# Patient Record
Sex: FEMALE | Race: WHITE
[De-identification: names, ages, dates, MRNs, and addresses within clinical notes are randomized per-mention and may not be internally consistent; named-entity substitution may affect disease eponyms.]

---

## 2017-02-20 ENCOUNTER — HOSPITAL (OUTPATIENT)
Dept: OTHER | Age: 37
End: 2017-02-20
Attending: FAMILY MEDICINE

## 2017-02-20 LAB
CONTROL LINE: PRESENT
INFLU A POC: NORMAL
INFLU B POC: NORMAL
Lab: CLEAR

## 2021-04-05 PROCEDURE — 88271 CYTOGENETICS DNA PROBE: CPT | Performed by: CLINICAL MEDICAL LABORATORY

## 2021-04-05 PROCEDURE — PSEU9972 CHROMOSOME ANALYSIS BONE MARROW: Performed by: CLINICAL MEDICAL LABORATORY

## 2021-04-05 PROCEDURE — 88237 TISSUE CULTURE BONE MARROW: CPT | Performed by: CLINICAL MEDICAL LABORATORY

## 2021-04-05 PROCEDURE — 88275 CYTOGENETICS 100-300: CPT | Performed by: CLINICAL MEDICAL LABORATORY

## 2021-04-05 PROCEDURE — 88264 CHROMOSOME ANALYSIS 20-25: CPT | Performed by: CLINICAL MEDICAL LABORATORY

## 2021-04-05 PROCEDURE — PSEU10080 FISH BCR/ABL: Performed by: CLINICAL MEDICAL LABORATORY

## 2021-04-06 ENCOUNTER — LAB REQUISITION (OUTPATIENT)
Dept: LAB | Age: 41
End: 2021-04-06

## 2021-04-06 DIAGNOSIS — D68.62 LUPUS ANTICOAGULANT SYNDROME (CMD): ICD-10-CM

## 2021-04-06 DIAGNOSIS — D72.829 ELEVATED WHITE BLOOD CELL COUNT, UNSPECIFIED: ICD-10-CM

## 2021-04-15 LAB
ADDITIONAL COMMENTS: NORMAL
CELL GROWTH FIB QL TISS CULTURE: NORMAL
KARYOTYP CVS: NORMAL
KARYOTYP CVS: NORMAL
Lab: NORMAL
Lab: NORMAL

## 2021-04-19 LAB
ADDITIONAL COMMENTS: NORMAL
BANDING TECHNIQUE: NORMAL
CELLS COUNTED: NORMAL
GENE ANALYSIS NARR RPT DOC: NORMAL
INTERPRETATION: NORMAL
KARYOTYP CVS: NORMAL
Lab: NORMAL
Lab: NORMAL

## 2023-01-14 ENCOUNTER — OFFICE VISIT (OUTPATIENT)
Dept: RHEUMATOLOGY | Facility: CLINIC | Age: 43
End: 2023-01-14

## 2023-01-14 VITALS
WEIGHT: 215 LBS | BODY MASS INDEX: 40.59 KG/M2 | SYSTOLIC BLOOD PRESSURE: 117 MMHG | HEIGHT: 61 IN | HEART RATE: 93 BPM | OXYGEN SATURATION: 92 % | DIASTOLIC BLOOD PRESSURE: 53 MMHG

## 2023-01-14 DIAGNOSIS — R53.83 FATIGUE, UNSPECIFIED TYPE: Primary | ICD-10-CM

## 2023-01-14 DIAGNOSIS — M79.18 DIFFUSE MYOFASCIAL PAIN SYNDROME: ICD-10-CM

## 2023-01-14 DIAGNOSIS — R21 RASH OF FACE: ICD-10-CM

## 2023-01-14 DIAGNOSIS — M25.50 POLYARTHRALGIA: ICD-10-CM

## 2023-01-14 PROCEDURE — 99204 OFFICE O/P NEW MOD 45 MIN: CPT | Performed by: INTERNAL MEDICINE

## 2023-01-14 PROCEDURE — 3078F DIAST BP <80 MM HG: CPT | Performed by: INTERNAL MEDICINE

## 2023-01-14 PROCEDURE — 3074F SYST BP LT 130 MM HG: CPT | Performed by: INTERNAL MEDICINE

## 2023-01-14 PROCEDURE — 3008F BODY MASS INDEX DOCD: CPT | Performed by: INTERNAL MEDICINE

## 2023-01-14 RX ORDER — ARIPIPRAZOLE 10 MG/1
TABLET ORAL
COMMUNITY
Start: 1900-01-01

## 2023-01-14 RX ORDER — ARIPIPRAZOLE 10 MG/1
TABLET ORAL
COMMUNITY
Start: 2023-01-02 | End: 2023-01-14

## 2023-01-14 RX ORDER — ARIPIPRAZOLE 20 MG/1
20 TABLET ORAL DAILY
COMMUNITY
Start: 2009-01-01

## 2023-01-14 RX ORDER — CEPHALEXIN 500 MG/1
CAPSULE ORAL
COMMUNITY
Start: 2023-01-10

## 2023-01-14 RX ORDER — HYDROCODONE BITARTRATE AND ACETAMINOPHEN 5; 325 MG/1; MG/1
TABLET ORAL
COMMUNITY
Start: 2022-12-15

## 2023-01-14 RX ORDER — CIPROFLOXACIN 500 MG/1
TABLET, FILM COATED ORAL EVERY 12 HOURS
COMMUNITY

## 2023-01-14 RX ORDER — OMEPRAZOLE 20 MG/1
20 TABLET, DELAYED RELEASE ORAL 2 TIMES DAILY
COMMUNITY
Start: 2022-12-19

## 2023-01-14 RX ORDER — DEXTROAMPHETAMINE SACCHARATE, AMPHETAMINE ASPARTATE, DEXTROAMPHETAMINE SULFATE AND AMPHETAMINE SULFATE 1.25; 1.25; 1.25; 1.25 MG/1; MG/1; MG/1; MG/1
TABLET ORAL
COMMUNITY
Start: 2022-10-13

## 2023-01-14 RX ORDER — MECLIZINE HYDROCHLORIDE 25 MG/1
TABLET ORAL
COMMUNITY
Start: 2022-07-01

## 2023-01-14 RX ORDER — CIPROFLOXACIN AND DEXAMETHASONE 3; 1 MG/ML; MG/ML
SUSPENSION/ DROPS AURICULAR (OTIC)
COMMUNITY

## 2023-01-14 RX ORDER — SPIRONOLACT/HYDROCHLOROTHIAZID 25 MG-25MG
TABLET ORAL AS DIRECTED
COMMUNITY

## 2023-01-14 RX ORDER — CLONAZEPAM 0.25 MG/1
1 TABLET, ORALLY DISINTEGRATING ORAL AS NEEDED
COMMUNITY

## 2023-01-14 RX ORDER — CLONAZEPAM 0.5 MG/1
2 TABLET ORAL
COMMUNITY

## 2023-01-14 RX ORDER — CLONAZEPAM 2 MG/1
1 TABLET ORAL AS DIRECTED
COMMUNITY

## 2023-01-14 RX ORDER — LEVOFLOXACIN 750 MG/1
TABLET ORAL
COMMUNITY
Start: 2022-06-09

## 2023-01-14 RX ORDER — DIPHENHYDRAMINE HCL 25 MG
TABLET ORAL
COMMUNITY
End: 2023-01-14

## 2023-01-14 RX ORDER — CYCLOBENZAPRINE HCL 10 MG
TABLET ORAL
COMMUNITY

## 2023-01-14 RX ORDER — FLUOXETINE 10 MG/1
CAPSULE ORAL
COMMUNITY
Start: 2019-01-01

## 2023-01-14 RX ORDER — BUSPIRONE HYDROCHLORIDE 30 MG/1
30 TABLET ORAL DAILY
COMMUNITY
Start: 2009-01-01

## 2023-01-14 RX ORDER — IPRATROPIUM BROMIDE AND ALBUTEROL SULFATE 2.5; .5 MG/3ML; MG/3ML
SOLUTION RESPIRATORY (INHALATION)
COMMUNITY

## 2023-01-14 RX ORDER — ARIPIPRAZOLE 15 MG/1
TABLET ORAL
COMMUNITY
Start: 2020-05-14

## 2023-01-14 RX ORDER — MULTIVITAMIN
1 TABLET ORAL DAILY
COMMUNITY

## 2023-01-14 RX ORDER — KETOROLAC TROMETHAMINE 30 MG/ML
60 INJECTION, SOLUTION INTRAMUSCULAR; INTRAVENOUS
COMMUNITY
End: 2023-01-14

## 2023-01-14 RX ORDER — ARIPIPRAZOLE 15 MG/1
TABLET, ORALLY DISINTEGRATING ORAL
COMMUNITY

## 2023-01-14 RX ORDER — FEXOFENADINE HCL 180 MG/1
TABLET ORAL
COMMUNITY
Start: 2022-11-01

## 2023-01-14 RX ORDER — PREDNISONE 10 MG/1
1 TABLET ORAL AS DIRECTED
COMMUNITY
End: 2023-01-14

## 2023-01-14 RX ORDER — CEFDINIR 300 MG/1
CAPSULE ORAL
COMMUNITY

## 2023-01-14 RX ORDER — DEXTROAMPHETAMINE SACCHARATE, AMPHETAMINE ASPARTATE, DEXTROAMPHETAMINE SULFATE AND AMPHETAMINE SULFATE 2.5; 2.5; 2.5; 2.5 MG/1; MG/1; MG/1; MG/1
TABLET ORAL
COMMUNITY
Start: 2023-01-02

## 2023-01-14 RX ORDER — INSULIN DEGLUDEC INJECTION 100 U/ML
INJECTION, SOLUTION SUBCUTANEOUS
COMMUNITY
End: 2023-01-14

## 2023-01-14 RX ORDER — TRIAMCINOLONE ACETONIDE 5 MG/G
CREAM TOPICAL
COMMUNITY

## 2023-01-14 RX ORDER — DEXTROAMPHETAMINE SACCHARATE, AMPHETAMINE ASPARTATE MONOHYDRATE, DEXTROAMPHETAMINE SULFATE AND AMPHETAMINE SULFATE 5; 5; 5; 5 MG/1; MG/1; MG/1; MG/1
CAPSULE, EXTENDED RELEASE ORAL
COMMUNITY
Start: 1998-01-01

## 2023-01-14 RX ORDER — ASCORBIC ACID 100 MG
TABLET,CHEWABLE ORAL AS DIRECTED
COMMUNITY

## 2023-01-14 RX ORDER — CLONAZEPAM 1 MG/1
TABLET ORAL
COMMUNITY
Start: 2016-08-01

## 2023-01-14 RX ORDER — ARIPIPRAZOLE 15 MG/1
TABLET ORAL
COMMUNITY
End: 2023-01-14

## 2023-01-14 RX ORDER — DEXTROAMPHETAMINE SULFATE 10 MG/1
TABLET ORAL
COMMUNITY
Start: 1900-01-01

## 2023-01-14 RX ORDER — FLUTICASONE PROPIONATE 50 MCG
SPRAY, SUSPENSION (ML) NASAL
COMMUNITY
Start: 2022-08-31

## 2023-01-14 RX ORDER — BUSPIRONE HYDROCHLORIDE 30 MG/1
TABLET ORAL
COMMUNITY

## 2023-01-14 RX ORDER — PHENTERMINE HYDROCHLORIDE 15 MG/1
CAPSULE ORAL
COMMUNITY

## 2023-01-14 RX ORDER — BUSPIRONE HYDROCHLORIDE 15 MG/1
TABLET ORAL
COMMUNITY
Start: 1900-01-01

## 2023-01-14 RX ORDER — FLUOXETINE 10 MG/1
TABLET, FILM COATED ORAL
COMMUNITY

## 2023-01-14 RX ORDER — ALBUTEROL SULFATE 90 UG/1
AEROSOL, METERED RESPIRATORY (INHALATION)
COMMUNITY
Start: 2015-12-09

## 2023-01-14 RX ORDER — OMEGA-3 FATTY ACIDS/FISH OIL 300-500 MG
CAPSULE ORAL AS DIRECTED
COMMUNITY

## 2023-01-14 NOTE — PROGRESS NOTES
Annalisa Vilchis is a 59-year-old woman self-referred for evaluation of multiple concerns. She is looking for a new primary care provider. She saw a rheumatologist at [facility] in 2017, and was felt to have \"fibromyalgia\". Labs were negative except for a lupus anticoagulant test. She has no history of clot or miscarriages. Her cheeks can get red on and off. Her face can swell, with broken capillaries. At the end of November of 2022, she broke out into a rash, which was more severe over her right cheek than left. She shows me a picture. It looks like there was bruising, swelling, erythema. She denies injury. She has been having dental issues, and thought at first it was related to that. She had a root canal, upper and lower teeth extractions. It has gotten better. Her dentist gave her several courses of oral steroids, which decreased the swelling over her cheeks, but not did not decrease the capillaries. She has been felt to possibly have psoriasis. She has small patches inside her ears, behind her ears, and over her right ankle. She has never broken out into rashes from the sun. She has hot and cold flashes. Since July 2022, she has had bad vertigo. Meclizine helps. It can happen several times a day. She saw a neurologist, and her head scans were negative. She is now on antibiotics for a kidney infection. She had pain in her CTA area. She has frequency and cannot empty. She has diffuse musculoskeletal pain. Her muscles are tender to the touch all over. It can feel like the flu. When she gets out of bed, she feels puffiness all over her body, including her hands, forearms, upper arms, etc.  This has been chronic. It is harder to make tight fists and  objects in the morning. She takes a lot of medicines through her psychiatrist, so sleeps well, but she is not refreshed on awakening, and her energy is very low.     She recently was seen in the emergency room and given some Norco for her pain. January 10th of 2023 at [facility] [place] was normal, except white count of 11,300. Hemoglobin A1c 5.0. CMP normal, except AST of 47 and ALT 36. Magnesium, B12, 25 hydroxy vitamin D normal.      Past medical history:  She has high cholesterol. She has allergy induced asthma, on inhalers. She has COPD. She had smoked up to 1/2 pack of cigarettes daily. She has been on and off several times, giving them up again a month ago. She had pneumonia in November 2015. She has had morbid obesity, eating disorder, fatty liver. She had gastric sleeve procedure in September 2022 and lost 90 pounds. She has had atopic dermatitis, possible psoriasis. She had diabetes which is in remission since she is lost weight. She follows with psychiatry for bipolar, ADHD, anxiety, PTSD disorder and is on multiple medications. Her Abilify dose recently was increased for anxiety. She has a history of HPV. She has a history of endometriosis and has had complete hysterectomy. She had several C-sections. She had hernia repair and appendectomy. She had laparoscopic procedures. She is on albuterol inhaler as needed, Adderall, Abilify, aripiprazole, BuSpar, antibiotic, clonazepam, cyclobenzaprine, Dextrostat, Allegra, Prozac, Norco which she got in the emergency room recently, ibuprofen 600 mg which does not help, Antivert once or twice a day, multivitamins, omega-3 fatty acids, omeprazole for acid reflux. She is intolerant of carbamazepine, sulfa. Family history: There is a lot of autoimmune disorders on her father side including scleroderma, Wegener's, rheumatoid arthritis, ulcerative colitis. Social history:  She is  with 2 children. Her son is autistic. She does not work outside of the home. She is on and off cigarettes. Occasional alcohol. She drinks coffee. She gets on her stationary bike and remains very active.     Review of systems:  Since her gastric sleeve her hair is thinned. She has occasional blurry vision which has been felt to possibly be from a psychotic break or migraine. No internal eye inflammation. She has been getting mouth ulcers. No lymphadenopathy. She has shortness of breath, chest pain, which it gets better with her inhalers. She has acid reflux, stomach pain, pelvic pain, nausea, constipation or diarrhea, x-ray occasional bright red blood per rectum. She has hemorrhoids. She has trouble emptying her bladder. Recently her eyes have been dry. Her mouth is dry. She has had multiple headaches including those behind her eyes, and most recently in her posterior neck. No Raynaud's. Physical exam:  Pleasant woman in no acute distress. Blood pressure 117/53, pulse 93, height 5' 1\" (1.549 m), weight 215 lb (97.5 kg), SpO2 92 %. There is erythema with capillaries over her cheeks bilaterally. No obvious psoriasis inside her ears, behind her ears, or over her right anterior ankle. No alopecia. Mild conjunctival injection. No nasal or oral lesions. No cervical adenopathy. Lungs clear. S1 and S2 regular. Abdomen with diffuse tenderness without rebound or guarding. Normal bowel sounds. No lower extremity edema. Cervical spine motion is limited. Shoulder motion is mildly limited and painful. Elbows flex and extend fully. Wrist seem to move well. No obvious synovitis across her hand MCP, PIP, or DIP joint joints, but is hard to tell given her body habitus.  strength is weak bilaterally. Capillary refill is good in her fingertips. Lumbar spine flexion is good but uncomfortable. Hips move okay. Knees flex and extend well with discomfort. Ankles move okay. The balls of her feet are tender to squeeze. She has diffuse myofascial discomfort to palpation from her neck into her shoulders, across her upper and lower back, lateral hips, lateral elbows, etc.    Assessment and plan:    1. Chronic diffuse myofascial pain syndrome.   She has nonrestorative sleep and fatigue. She has associated headaches, probable irritable bowel syndrome, tension headaches. She is on multiple medications through her psychiatrist for her psychiatric disorders. We discussed how this is a central nervous system sensitization to pain. I referred her to for physical therapy for 12 visits, for help in establishing an exercise program.  She needs to take control. 2.  Morbid obesity, status post gastric sleeve in September 2022. She has lost 90 pounds, and is doing much better. Her diabetes went away. 3.  Doubt lupus or rheumatoid arthritis. The rash over her face is not typical.  No history of sun induced rash. She describes swelling of her hands in the morning, although she is diffusely puffy all over in the morning. ROGER with reflex specific antibodies, rheumatoid factor, and CCP antibody will be done at [address] [place] [address].  She will follow-up in several weeks. 4.  Vertigo. Neurology studies were negative. 5.  Asthma, COPD, cigarette use. Inhalers help. 6.  Possible psoriasis. She needs to see a dermatologist.    7.  History of pelvic pain, endometriosis, status post total hysterectomy. 8.  She needs to establish with a new primary care provider.

## 2023-01-26 LAB
ANA SCREEN, IFA: NEGATIVE
CYCLIC CITRULLINATED$PEPTIDE (CCP) AB (IGG): <16 UNITS
DNA (DS) ANTIBODY: 21 IU/ML
RHEUMATOID FACTOR: <14 IU/ML
TSH: 1.3 MIU/L

## 2023-01-30 ENCOUNTER — TELEMEDICINE (OUTPATIENT)
Dept: RHEUMATOLOGY | Facility: CLINIC | Age: 43
End: 2023-01-30

## 2023-01-30 DIAGNOSIS — M79.18 DIFFUSE MYOFASCIAL PAIN SYNDROME: Primary | ICD-10-CM

## 2023-01-30 PROCEDURE — 99212 OFFICE O/P EST SF 10 MIN: CPT | Performed by: INTERNAL MEDICINE

## 2023-01-30 NOTE — PROGRESS NOTES
Jo-Ann Hernandez is a 39-year-old woman who I saw for the first time January 14, 2023 with facial rash, diffuse myofascial pain syndrome. She was concerned about lupus. Labs were done. ROGER screen was negative. Double-stranded DNA antibody was 21 (less than 10). Rheumatoid factor and CCP antibody negative. Specific antibodies negative to double-stranded DNA, SSA, SSB, Herring, RNP, scleroderma 70. A video/audio visit was done over [employer]. Telehealth outside of [employer] Verbal Consent   I conducted a telehealth visit with Cait Arevalo today, 01/30/23, which was completed using two-way, real-time interactive audio and video communication. This has been done in good quin[place] to provide continuity of care in the best interest of the provider-patient relationship, due to the COVID - public health crisis/national emergency where restrictions of face-to-face office visits are ongoing. Every conscious effort was taken to allow for sufficient and adequate time to complete the visit. The patient was made aware of the limitations of the telehealth visit, including treatment limitations as no physical exam could be performed. The patient was advised to call 911 or to go to the ER in case there was an emergency. The patient was also advised of the potential privacy & security concerns related to the telehealth platform. The patient was made aware of where to find [facility] notice of privacy practices, telehealth consent form and other related consent forms and documents. which are located on the [facility] website. The patient verbally agreed to telehealth consent form, related consents and the risks discussed. Lastly, the patient confirmed that they were in [place]. Included in this visit, time may have been spent reviewing labs, medications, radiology tests and decision making. Appropriate medical decision-making and tests are ordered as detailed in the plan of care above.   Coding/billing information is submitted for this visit based on complexity of care and/or time spent for the visit. Her  has COVID. She has not scheduled her appointment in Physical Therapy. She still has the facial rash that is visible over video. She is in no acute distress. Assessment and plan:    1. Chronic diffuse myofascial pain syndrome. She has nonrestorative sleep and fatigue. She has associated headaches, probable irritable bowel syndrome, tension headaches. She is on multiple medications through her psychiatrist for her psychiatric disorders. She will schedule her first appointment in physical therapy, for 12 visits, for help in establishing an exercise program.  She needs to take control. She will schedule back in 3 months. 2.  Morbid obesity, status post gastric sleeve in September 2022. She has lost 90 pounds, and is doing much better. Her diabetes went away. 3.  I reassured her that she doesn't have lupus or rheumatoid arthritis. The rash over her face is not typical.  I do not think that her double-stranded DNA is significant. She will schedule an appointment with a dermatologist.    4.  Vertigo. Neurology studies were negative. 5.  Asthma, COPD, cigarette use. Inhalers help. 6.  Possible psoriasis. She needs to see a dermatologist.    7.  History of pelvic pain, endometriosis, status post total hysterectomy. 8.  She needs to establish with a new primary care provider.

## 2025-02-04 ENCOUNTER — LAB REQUISITION (OUTPATIENT)
Dept: LAB | Age: 45
End: 2025-02-04

## 2025-02-04 DIAGNOSIS — Z00.00 ENCOUNTER FOR GENERAL ADULT MEDICAL EXAMINATION WITHOUT ABNORMAL FINDINGS: ICD-10-CM

## 2025-02-04 DIAGNOSIS — E66.9 OBESITY, UNSPECIFIED: ICD-10-CM

## 2025-02-04 LAB
APPEARANCE UR: CLEAR
BACTERIA #/AREA URNS HPF: ABNORMAL /HPF
BASOPHILS # BLD: 0 K/MCL (ref 0–0.3)
BASOPHILS NFR BLD: 0 %
BILIRUB UR QL STRIP: NEGATIVE
COLOR UR: ABNORMAL
DEPRECATED RDW RBC: 41.3 FL (ref 39–50)
EOSINOPHIL # BLD: 0.1 K/MCL (ref 0–0.5)
EOSINOPHIL NFR BLD: 0 %
ERYTHROCYTE [DISTWIDTH] IN BLOOD: 12.7 % (ref 11–15)
GGT SERPL-CCNC: 40 UNITS/L (ref 5–55)
GLUCOSE UR STRIP-MCNC: NEGATIVE MG/DL
HBA1C MFR BLD: 4.6 % (ref 4.5–5.6)
HCT VFR BLD CALC: 45.5 % (ref 36–46.5)
HGB BLD-MCNC: 14.9 G/DL (ref 12–15.5)
HGB UR QL STRIP: NEGATIVE
HYALINE CASTS #/AREA URNS LPF: ABNORMAL /LPF
IMM GRANULOCYTES # BLD AUTO: 0 K/MCL (ref 0–0.2)
IMM GRANULOCYTES # BLD: 0 %
KETONES UR STRIP-MCNC: NEGATIVE MG/DL
LEUKOCYTE ESTERASE UR QL STRIP: ABNORMAL
LYMPHOCYTES # BLD: 2.7 K/MCL (ref 1–4.8)
LYMPHOCYTES NFR BLD: 22 %
MAGNESIUM SERPL-MCNC: 2.3 MG/DL (ref 1.7–2.4)
MCH RBC QN AUTO: 29.2 PG (ref 26–34)
MCHC RBC AUTO-ENTMCNC: 32.7 G/DL (ref 32–36.5)
MCV RBC AUTO: 89 FL (ref 78–100)
MONOCYTES # BLD: 0.7 K/MCL (ref 0.3–0.9)
MONOCYTES NFR BLD: 5 %
NEUTROPHILS # BLD: 9 K/MCL (ref 1.8–7.7)
NEUTROPHILS NFR BLD: 73 %
NITRITE UR QL STRIP: NEGATIVE
NRBC BLD MANUAL-RTO: 0 /100 WBC
PH UR STRIP: 6 [PH] (ref 5–7)
PHOSPHATE SERPL-MCNC: 3.8 MG/DL (ref 2.4–4.7)
PLATELET # BLD AUTO: 311 K/MCL (ref 140–450)
PROT UR STRIP-MCNC: NEGATIVE MG/DL
RBC # BLD: 5.11 MIL/MCL (ref 4–5.2)
RBC #/AREA URNS HPF: ABNORMAL /HPF
SP GR UR STRIP: 1.01 (ref 1–1.03)
SQUAMOUS #/AREA URNS HPF: ABNORMAL /HPF
URATE SERPL-MCNC: 4.6 MG/DL (ref 2.6–5.9)
UROBILINOGEN UR STRIP-MCNC: 0.2 MG/DL
WBC # BLD: 12.5 K/MCL (ref 4.2–11)
WBC #/AREA URNS HPF: ABNORMAL /HPF

## 2025-02-04 PROCEDURE — PSEU8266 GLYCOHEMOGLOBIN: Performed by: CLINICAL MEDICAL LABORATORY

## 2025-02-04 PROCEDURE — 80053 COMPREHEN METABOLIC PANEL: CPT | Performed by: CLINICAL MEDICAL LABORATORY

## 2025-02-04 PROCEDURE — PSEU9005 URINE, BACTERIAL CULTURE: Performed by: CLINICAL MEDICAL LABORATORY

## 2025-02-04 PROCEDURE — PSEU8274 MAGNESIUM: Performed by: CLINICAL MEDICAL LABORATORY

## 2025-02-04 PROCEDURE — PSEU8270 LIPID PANEL WITHOUT REFLEX: Performed by: CLINICAL MEDICAL LABORATORY

## 2025-02-04 PROCEDURE — 83036 HEMOGLOBIN GLYCOSYLATED A1C: CPT | Performed by: CLINICAL MEDICAL LABORATORY

## 2025-02-04 PROCEDURE — PSEU8204 IRON: Performed by: CLINICAL MEDICAL LABORATORY

## 2025-02-04 PROCEDURE — 85025 COMPLETE CBC W/AUTO DIFF WBC: CPT | Performed by: CLINICAL MEDICAL LABORATORY

## 2025-02-04 PROCEDURE — PSEU8262 FREE T4: Performed by: CLINICAL MEDICAL LABORATORY

## 2025-02-04 PROCEDURE — 87086 URINE CULTURE/COLONY COUNT: CPT | Performed by: CLINICAL MEDICAL LABORATORY

## 2025-02-04 PROCEDURE — 84439 ASSAY OF FREE THYROXINE: CPT | Performed by: CLINICAL MEDICAL LABORATORY

## 2025-02-04 PROCEDURE — PSEU8458 URINALYSIS WITH MICROSCOPY & CULTURE IF INDICATED: Performed by: CLINICAL MEDICAL LABORATORY

## 2025-02-04 PROCEDURE — PSEU8250 COMPREHENSIVE METABOLIC PANEL: Performed by: CLINICAL MEDICAL LABORATORY

## 2025-02-04 PROCEDURE — 84550 ASSAY OF BLOOD/URIC ACID: CPT | Performed by: CLINICAL MEDICAL LABORATORY

## 2025-02-04 PROCEDURE — 84100 ASSAY OF PHOSPHORUS: CPT | Performed by: CLINICAL MEDICAL LABORATORY

## 2025-02-04 PROCEDURE — PSEU8263 GGT: Performed by: CLINICAL MEDICAL LABORATORY

## 2025-02-04 PROCEDURE — PSEU8567 MICROALBUMIN URINE RANDOM: Performed by: CLINICAL MEDICAL LABORATORY

## 2025-02-04 PROCEDURE — PSEU8299 THYROID STIMULATING HORMONE: Performed by: CLINICAL MEDICAL LABORATORY

## 2025-02-04 PROCEDURE — 83615 LACTATE (LD) (LDH) ENZYME: CPT | Performed by: CLINICAL MEDICAL LABORATORY

## 2025-02-04 PROCEDURE — 80061 LIPID PANEL: CPT | Performed by: CLINICAL MEDICAL LABORATORY

## 2025-02-04 PROCEDURE — PSEU8268 LACTATE DEHYDROGENASE: Performed by: CLINICAL MEDICAL LABORATORY

## 2025-02-04 PROCEDURE — 84443 ASSAY THYROID STIM HORMONE: CPT | Performed by: CLINICAL MEDICAL LABORATORY

## 2025-02-04 PROCEDURE — 82977 ASSAY OF GGT: CPT | Performed by: CLINICAL MEDICAL LABORATORY

## 2025-02-04 PROCEDURE — 83735 ASSAY OF MAGNESIUM: CPT | Performed by: CLINICAL MEDICAL LABORATORY

## 2025-02-04 PROCEDURE — PSEU8279 PHOSPHORUS: Performed by: CLINICAL MEDICAL LABORATORY

## 2025-02-04 PROCEDURE — PSEU8303 URIC ACID: Performed by: CLINICAL MEDICAL LABORATORY

## 2025-02-04 PROCEDURE — 81001 URINALYSIS AUTO W/SCOPE: CPT | Performed by: CLINICAL MEDICAL LABORATORY

## 2025-02-04 PROCEDURE — 83540 ASSAY OF IRON: CPT | Performed by: CLINICAL MEDICAL LABORATORY

## 2025-02-04 PROCEDURE — 82570 ASSAY OF URINE CREATININE: CPT | Performed by: CLINICAL MEDICAL LABORATORY

## 2025-02-04 PROCEDURE — 82043 UR ALBUMIN QUANTITATIVE: CPT | Performed by: CLINICAL MEDICAL LABORATORY

## 2025-02-05 LAB
ALBUMIN SERPL-MCNC: 4.1 G/DL (ref 3.4–5)
ALBUMIN/GLOB SERPL: 1.1 {RATIO} (ref 1–2.4)
ALP SERPL-CCNC: 102 UNITS/L (ref 45–117)
ALT SERPL-CCNC: 74 UNITS/L
ANION GAP SERPL CALC-SCNC: 8 MMOL/L (ref 7–19)
AST SERPL-CCNC: 32 UNITS/L
BILIRUB SERPL-MCNC: 0.7 MG/DL (ref 0.2–1)
BUN SERPL-MCNC: 12 MG/DL (ref 6–20)
BUN/CREAT SERPL: 16 (ref 7–25)
CALCIUM SERPL-MCNC: 9.7 MG/DL (ref 8.4–10.2)
CHLORIDE SERPL-SCNC: 106 MMOL/L (ref 97–110)
CHOLEST SERPL-MCNC: 134 MG/DL
CHOLEST/HDLC SERPL: 3 {RATIO}
CO2 SERPL-SCNC: 28 MMOL/L (ref 21–32)
CREAT SERPL-MCNC: 0.75 MG/DL (ref 0.51–0.95)
CREAT UR-MCNC: 58.6 MG/DL
EGFRCR SERPLBLD CKD-EPI 2021: >90 ML/MIN/{1.73_M2}
FASTING DURATION TIME PATIENT: ABNORMAL H
GLOBULIN SER-MCNC: 3.6 G/DL (ref 2–4)
GLUCOSE SERPL-MCNC: 103 MG/DL (ref 70–99)
HDLC SERPL-MCNC: 44 MG/DL
IRON SERPL-MCNC: 30 MCG/DL (ref 50–170)
LDH SERPL L TO P-CCNC: 231 UNITS/L (ref 82–240)
LDLC SERPL CALC-MCNC: 67 MG/DL
MICROALBUMIN UR-MCNC: <0.5 MG/DL
MICROALBUMIN/CREAT UR: NORMAL MG/G{CREAT}
NONHDLC SERPL-MCNC: 90 MG/DL
POTASSIUM SERPL-SCNC: 3.8 MMOL/L (ref 3.4–5.1)
PROT SERPL-MCNC: 7.7 G/DL (ref 6.4–8.2)
SODIUM SERPL-SCNC: 138 MMOL/L (ref 135–145)
T4 FREE SERPL-MCNC: 1.1 NG/DL (ref 0.8–1.5)
TRIGL SERPL-MCNC: 114 MG/DL
TSH SERPL-ACNC: 0.98 MCUNITS/ML (ref 0.35–5)

## 2025-02-06 LAB — BACTERIA UR CULT: NORMAL
